# Patient Record
(demographics unavailable — no encounter records)

---

## 2020-01-25 NOTE — RAD
EXAM: Single view of the chest



HISTORY:   Dyspnea



COMPARISON: 7/7/2018



FINDINGS: Single view of the chest shows an enlarged but stable cardiomediastinal silhouette.  Athero
sclerotic calcifications are seen in the aorta.  There is no evidence of consolidation, mass, or

pleural effusion. The bones are unremarkable.



IMPRESSION: No evidence of acute cardiopulmonary disease



Reported By: Josh Duron 

Electronically Signed:  1/25/2020 10:59 AM

## 2020-01-26 NOTE — PRG
DATE OF SERVICE:  01/26/2020



SUBJECTIVE:  The patient is seen and examined at the bedside.  She coughs up quite a

bit of yellowish phlegm.  Her shortness of breath is somewhat better.  She is on

oxygen. 



OBJECTIVE:  VITAL SIGNS:  Blood pressure is 137/71, pulse is 85, temperature is

97.6, respiratory rate is 16, O2 saturation is 95% on 2 L by nasal cannula. 

HEENT:  Head is atraumatic and normocephalic.  Eyes are PERRLA.  Sclerae are

nonicteric.  Oral mucosa is moist. 

NECK:  Supple. 

LUNGS:  A few wheezes and rales at both bases.  Lung exam, very emphysematous. 

HEART:  S1, S2 normal.  No S3.  No S4.  Regular. 

ABDOMEN:  Soft, nontender.  Bowel sounds are present.  No organomegaly. 

EXTREMITIES:  No clubbing, cyanosis, or edema. 

NEUROLOGIC:  She is alert and oriented x4.  There are no any motor or sensory

deficits.  Cranial nerves are intact. 



LABORATORY DATA:  Labs showed white count of 5.4, hemoglobin 13.8, hematocrit 42.7,

platelet count 285,000.  Glucose 158, BUN 9.  Electrolytes within normal limits.

Calcium 8.8. 



IMPRESSION:  

1. Exacerbation of chronic obstructive pulmonary disease.

2. Acute respiratory failure with hypoxia.

3. History of congestive heart failure, mild exacerbation.

4. Atrial fibrillation, which converted to normal sinus rhythm at 2:30 this morning

according to the monitoring. 

5. Hyperlipidemia.

6. Hypothyroidism.

7. History of seizure disorder.

8. History of brain aneurysm, status post surgery.



PLAN:  Continue her current regimen, which includes DuoNeb inhaled and IV steroids,

azithromycin IV, apixaban 5 mg twice a day, furosemide 20 mg daily, Coreg 6.25 mg

twice a day, diltiazem 180 mg daily, levothyroxine 50 mcg once a day, potassium

chloride, and ceftriaxone 1 g IV piggyback daily. 







Job ID:  [id number]

## 2020-01-26 NOTE — CON
DATE OF CONSULTATION:  01/26/2020



REASON FOR CONSULTATION:  COPD exacerbation.



HISTORY OF PRESENT ILLNESS:  The patient is an 80-year-old female, who came to the

hospital yesterday with a 3- to 4-day history of increasing shortness of breath that

started with a gastrointestinal infection on Monday of last week.  She now feels

better and has no complaints.  She says she has never been officially diagnosed with

COPD, but has taken some inhalers for quite some time.  She also has a history of

chronic systolic heart failure. 



PAST MEDICAL HISTORY:  

1. Probable COPD.

2. Chronic atrial fibrillation.

3. Systolic heart failure with EF 25%.

4. Hyperlipidemia.

5. Hypothyroidism.

6. Hypertension.

7. Seizure disorder.

8. Brain aneurysm, requiring surgery.

9. Hysterectomy.



MEDICATIONS:  Prior to admission,

1. Coreg.

2. Flovent.

3. Advair.

4. Lasix.

5. Levothyroxine.

6. Fish oil.

7. K-Dur.

8. Ranitidine.

9. Eliquis.

10. Aspirin.

11. Vitamin D3.

12. Cardizem CD.

13. Claritin.

14. Pravachol.



ALLERGIES:  IODINE.



SOCIAL HISTORY:  Quit smoking about 14 years ago after a two-pack year history for

40 years.  Occasionally drinks beer. 



FAMILY MEDICAL HISTORY:  .  Has history of heart disease in the family.



REVIEW OF SYSTEMS:  Twelve-point review of systems is otherwise negative.



PHYSICAL EXAMINATION:

VITAL SIGNS:  Temperature 98.2, pulse 99, respirations 16, O2 saturation 95%, and

blood pressure 144/89. 

GENERAL:  She is awake, alert, in no distress. 

HEENT:  Unremarkable. 

NECK:  No adenopathy or JVD. 

LUNGS:  Clear anteriorly. 

CARDIAC:  S1 and S2.  Regular. 

ABDOMEN:  Soft and nontender. 

EXTREMITIES:  No clubbing, cyanosis, or edema.



LABORATORY DATA:  Micro shows no growth today,.  White blood cell count 5.4,

hematocrit 42.7, and platelet count 285.  Sodium 137, potassium 4.7, chloride 103,

CO2 of 23, BUN 9, creatinine 0.7, glucose 158.  Her x-ray looks fairly clear. 



ASSESSMENT:  Chronic obstructive pulmonary disease with exacerbation-improved

quickly with antibiotics, nebulization treatment, and steroids. 



PLAN:  She should be able to go home as early as tomorrow.  I have nothing to add to

the current management.  We will be available as needed. 







Job ID:  [id number]

## 2020-01-26 NOTE — HP
REASON FOR ADMISSION:  COPD exacerbation.



HISTORY OF PRESENTING ILLNESS:  The patient gives history of cough with

expectoration of green sputum from Monday.  This has been progressively getting

worse.  No fever as such at home.  She does not use any home oxygen, but she 
does

use inhalers and not nebulizers.  She ambulates by herself.  No complaints of 
chest

pain or palpitation.  She lives alone and does all her activities of daily 
living.

She has taken a flu shot for this year. 



PAST MEDICAL AND SURGICAL HISTORY:  History of CHF with systolic and diastolic

dysfunction with ejection fraction of around 25%; chronic atrial fibrillation, 
she

sees Dr. Scott and has a followup appointment next month; dyslipidemia;

hypothyroidism; hypertension; history of seizure disorder; prior history of 
brain

aneurysm with surgery; hysterectomy. 



CURRENT MEDICATIONS:  The patient is on;

1. Coreg 6.25 mg twice daily.

2. Flovent 2 sprays to each nostril daily.

3. Advair Diskus inhaler twice daily.

4. Lasix 20 mg daily.

5. Levothyroxine 50 mcg p.o. daily.

6. Fish oil one capsule daily.

7. K-Dur 20 mEq p.o. daily.

8. Ranitidine 75 mg every 2 days.

9. Eliquis 5 mg twice daily.

10. Aspirin 81 mg daily.

11. Vitamin D3 1000 mg daily.

12. Cardizem  mg daily.

13. Claritin 10 mg p.o. daily.

14. Pravachol 40 mg p.o. at bedtime.



ALLERGIES:  TO IODINE AND SHELLFISH.



PERSONAL HISTORY:  Quit smoking 14 years ago, prior to which has smoked 2 packs 
a

day for nearly 40 years.  She drinks 2 beers at bedtime.  Does not abuse drugs.
  She

lives alone. 



FAMILY HISTORY:  The patient is  from last 32 years.  Her both parents 
are

.  Mom  of natural causes at the age of 80.  Father  of massive 
MI

at the age of 70.  The patient had 3 children, one of them  in a motor 
vehicle

accident.  Has 2 sons living, who are 60 and 55 years old. 



CODE STATUS:  Do not attempt to resuscitate.  This was discussed with the 
patient at

bedside.  Power of  is her close friend, Mr. ZAHEER Lundberg or her older son, 
Mr. Shan Cuevas. 



REVIEW OF SYSTEMS:  CONSTITUTIONAL:  Negative for weight loss or gain, ability 
to

conduct usual activities. 

SKIN:  Negative for rash, itching. 

EYES:  Negative for double vision, pain. 

ENT/MOUTH:  Negative for nose bleeding, neck stiffness, pain, tenderness. 

CARDIOVASCULAR:  Negative for palpitations, dyspnea on exertion, orthopnea. 

RESPIRATORY:  Negative for shortness of breath, wheezing, cough, hemoptysis, 
fever

or night sweats. 

GASTROINTESTINAL:  Negative for poor appetite, abdominal pain, heartburn, nausea
,

vomiting, constipation, or diarrhea. 

GENITOURINARY:  Negative for urgency, frequency, dysuria, nocturia. 

MUSCULOSKELETAL:  Negative for pain, swelling. 

NEUROLOGIC/PSYCHIATRIC:  Negative for anxiety, depression. 

ALLERGY/IMMUNOLOGIC:  Negative for skin rash, bleeding tendency.



PHYSICAL EXAMINATION:

GENERAL:  The patient is an 80-year-old female, who is currently not in any 
acute

distress. 

VITAL SIGNS:  Blood pressure 120/86, pulse 94 per minute, respiratory rate 24 
per

minute, temperature 98.1 degrees Fahrenheit, saturating 93% on 4 L nasal 
cannula. 

NECK:  Supple.  No elevated JVD. 

HEENT:  Eyes; extraocular muscles intact.  Pupils reacting to light.  Oral 
cavity,

mucous membranes are moist.  No exudates or congestion. 

CARDIOVASCULAR:  S1, S2 heard.  Irregular rhythm. 

RESPIRATORY:  Air entry 1+ bilateral.  Scattered wheezes plus bilateral.  There 
are

basal rales as well. 

ABDOMEN:  Soft.  Bowel sounds heard.  No tenderness, rigidity, or guarding. 

EXTREMITIES:  No peripheral edema or calf tenderness. 

VASCULAR:  Peripheral pulses 1+ bilateral.  No ischemic ulcerations or 
gangrene. 

CENTRAL NERVOUS SYSTEM:  No gross focal deficits noted.  The patient is alert,

awake, and oriented well. 

PSYCHIATRIC:  The patient's mood is euthymic.  No hallucinations or delusions.



LABORATORY DATA:  The patient's prior pulmonary function test done in 2018

showed mild obstructive lung disease without reversibility.  Chest x-ray done 
this

admission showed no acute cardiopulmonary abnormality.  EKG done shows atrial

flutter at 104 beats per minute.  There is T-inversion seen in lateral leads V4
, V5,

V6.  .  Serum glucose 192, lactic acid 2.7, BUN 8, creatinine 1.0.  Liver

enzymes within normal limits.  Albumin is 4.1.  Electrolytes stable.  White 
count of

9, H and H of 15 and 48, platelet count 300, MCV is 104 with 60% neutrophils, 21
%

bands, 14% monocytes.  Macrocytosis seen. 



CLINICAL IMPRESSION AND PLAN:  The patient will be admitted to telemetry for 
acute

respiratory failure with hypoxia, chronic obstructive pulmonary disease

exacerbation.  The patient does not appear to have clinically congestive heart

failure exacerbation, although, her ejection fraction is only 25%.  We will 
continue

her ceftriaxone and Zithromax, which has been started in the ER.  She will be 
on a

short course of steroids for 2 days with rapid taper.  DuoNeb q.6 hourly.  The

patient has known history of atrial fibrillation, but currently she is in atrial

flutter.  We will consult Dr. Taylor for Pulmonology.  If needed, Cardiology 
will

be consulted if her atrial fibrillation or flutter gets worse with nebulizers.  
We

will continue Eliquis, Coreg, Cardizem CD, Lasix, K-Dur, levothyroxine, fish oil
,

Pravachol as before.  We will continue to closely monitor her on telemetry for 
now. 







Job ID:  [id number]



MTDD

## 2020-01-27 NOTE — PDOC.HOSPP
- Subjective


Encounter Date: 01/27/20


Encounter Time: 09:20


Subjective: 





Pt seen for followup re: acute hypoxic respiratory failure.  feels better.





- Objective


Vital Signs & Weight: 


 Vital Signs (12 hours)











  Temp Pulse Resp BP BP BP Pulse Ox


 


 01/27/20 16:00  97.5 F L  97  18    127/99 H  96


 


 01/27/20 14:14   90  14    


 


 01/27/20 11:24  97.4 F L  94  18   135/92 H   95


 


 01/27/20 08:46     142/84 H   


 


 01/27/20 08:00        94 L


 


 01/27/20 07:38  97.8 F  94  18   139/94 H   94 L


 


 01/27/20 07:26        90 L


 


 01/27/20 07:22   91  14    








 Weight











Admit Weight                   135 lb 3.2 oz


 


Weight                         135 lb 3.2 oz














I&O: 


 











 01/26/20 01/27/20 01/28/20





 06:59 06:59 06:59


 


Intake Total  240 


 


Output Total  500 


 


Balance  -260 











Result Diagrams: 


 01/27/20 08:16





 01/27/20 08:16


Additional Labs: 


 Accuchecks











  01/27/20 01/27/20 01/27/20





  17:06 10:43 05:10


 


POC Glucose  178 H  165 H  154 H














  01/26/20





  20:29


 


POC Glucose  147 H








Labs and MARs reviewed by me


EKG Reviewed by me: Yes (Tele;  a. fib)





Hospitalist ROS





- Review of Systems


Respiratory: reports: SOB with excertion


Gastrointestinal: denies: nausea, vomiting, abdominal pain, diarrhea, 

constipation, melena, hematochezia


Genitourinary: denies: dysuria, frequency, incontinence, hematuria, retention





- Medication


Medications: 


Active Medications











Generic Name Dose Route Start Last Admin





  Trade Name Freq  PRN Reason Stop Dose Admin


 


Albuterol/Ipratropium  3 ml  01/26/20 01:00  01/27/20 14:14





  Duoneb  NEB   3 ml





  I4OE-WM ANTHONY   Administration





     





     





     





     


 


Apixaban  5 mg  01/26/20 09:00  01/27/20 08:45





  Eliquis  PO   5 mg





  BID ANTHONY   Administration





     





     





     





     


 


Aspirin  81 mg  01/26/20 09:00  01/27/20 08:45





  Ecotrin  PO   81 mg





  DAILY ANTHONY   Administration





     





     





     





     


 


Atorvastatin Calcium  10 mg  01/26/20 21:00  01/26/20 21:30





  Lipitor  PO   10 mg





  HS ANTHONY   Administration





     





     





     





     


 


Carvedilol  6.25 mg  01/26/20 09:00  01/27/20 08:46





  Coreg  PO   6.25 mg





  BID ANTHONY   Administration





     





     





     





     


 


Cholecalciferol  1,000 units  01/26/20 09:00  01/27/20 08:45





  Vitamin D3  PO   1,000 units





  DAILY ANTHONY   Administration





     





     





     





     


 


Diltiazem HCl  180 mg  01/26/20 09:00  01/27/20 08:47





  Cardizem Cd  PO   180 mg





  DAILY ANTHONY   Administration





     





     





     





     


 


Famotidine  20 mg  01/26/20 09:00  01/27/20 08:45





  Pepcid  PO   20 mg





  DAILY ANTHONY   Administration





     





     





     





     


 


Fish Oil  1,000 mg  01/26/20 09:00  01/27/20 08:46





  Fish Oil  PO   1,000 mg





  DAILY ANTHONY   Administration





     





     





     





     


 


Fluticasone Propionate  0 gm  01/26/20 09:00  01/27/20 08:46





  Flonase Nasal [place]  NASAL   2 spr





  DAILY ANTHONY   Administration





     





     





     





     


 


Furosemide  20 mg  01/26/20 09:00  01/27/20 08:45





  Lasix  PO   20 mg





  DAILY ANTHONY   Administration





     





     





     





     


 


Azithromycin 500 mg/ Sodium  250 mls @ 250 mls/hr  01/25/20 17:00  01/27/20 16:

29





  Chloride  IVPB   250 mls





  Q24HR ANTHONY   Administration





     





     





     





     


 


Ceftriaxone Sodium 1 gm/  100 mls @ 200 mls/hr  01/25/20 18:00  01/27/20 18:26





  Sodium Chloride  IVPB   100 mls





  Q24HR ANTHONY   Administration





     





     





     





     


 


Insulin Human Lispro  0 units  01/26/20 00:52  01/26/20 17:12





  Humalog  SC   4 unit





  .MODERATE SLIDING SC PRN   Administration





  Moderate Correctional Scale   





     





     





     


 


Levothyroxine Sodium  50 mcg  01/26/20 21:00  01/26/20 21:30





  Synthroid  PO   50 mcg





  HS ANTHONY   Administration





     





     





     





     


 


Loratadine  10 mg  01/26/20 09:00  01/27/20 08:46





  Claritin  PO   10 mg





  DAILY ANTHONY   Administration





     





     





     





     


 


Methylprednisolone Sodium Succinate  20 mg  01/26/20 06:00  01/27/20 14:50





  Solu-Medrol  IVP   20 mg





  Q8HR ANTHONY   Administration





     





     





     





     


 


Mometasone Furoate/Formoterol Fumar  2 puff  01/26/20 06:30  01/27/20 07:22





  Dulera 200 Mcg/5 Mcg Inhaler  INH   2 puff





  BID-RT ANTHONY   Administration





     





     





     





     


 


Potassium Chloride  20 meq  01/26/20 09:00  01/27/20 08:45





  K-Dur  PO   20 meq





  DAILY ANTHONY   Administration





     





     





     





     














- Exam


General Appearance: NAD


Eye: anicteric sclera


ENT: moist mucosa


Neck: supple, no JVD


Heart: no rubs, irregular


Respiratory: CTAB


Gastrointestinal: soft, non-tender


Extremities: no cyanosis


Skin: no lesions


Psychiatric: normal affect, normal behavior





Hosp A/P


(1) Acute respiratory failure with hypoxia


Code(s): J96.01 - ACUTE RESPIRATORY FAILURE WITH HYPOXIA   Status: Acute   





(2) COPD exacerbation


Code(s): J44.1 - CHRONIC OBSTRUCTIVE PULMONARY DISEASE W (ACUTE) EXACERBATION   

Status: Acute   





(3) Hypothyroid


Code(s): E03.9 - HYPOTHYROIDISM, UNSPECIFIED   Status: Chronic   





(4) Afib


Code(s): I48.91 - UNSPECIFIED ATRIAL FIBRILLATION   Status: Chronic   





- Plan


respiratory therapy, out of bed/ambulate





Pt had significant hypoxia with ambulation.


Will arrange for home oxygen and discharge home tomorrow.


Continue apixaban.


Switch to oral antibiotics and steroids.

## 2020-01-27 NOTE — PRG
DATE OF SERVICE:  01/27/2020



SUBJECTIVE:  Ms. Cuevas is anxious to go home.  She had no complaints today.



OBJECTIVE:  VITAL SIGNS:  Temperature 97.8, pulse 94, respirations 18, O2 saturation

94% on 1.5 L. 

HEENT:  Unremarkable. 

NECK:  No adenopathy or JVD. 

CHEST:  Clear without wheezing. 

CARDIAC:  S1 and S2, regular. 

ABDOMEN:  Soft. 

EXTREMITIES:  No edema.



ASSESSMENT:  Chronic obstructive pulmonary disease with exacerbation.



PLAN:  Can go home on tapered steroids, nebulization therapy, and antibiotics.

Pulmonary will sign off. 







Job ID:  [id number]

## 2020-01-29 NOTE — PQF
FIDEL PICHARDO DAVID

[id number]                                                             [address]

[id number]                             

                                   

CLINICAL DOCUMENTATION CLARIFICATION FORM:  POST DISCHARGE



Addendum to original discharge summary date:  __________________________________
____



Late entry note date:  _________________________________________________________
__











DATE:01/29/2020                                                    ATTN: ALEXANDER CARRENO





Please exercise your independent, professional judgment in responding to the 
clarification form. 

Clinical indicators are provided on the bottom of this form for your review





Please check appropriate box(s) to clarify if the following diagnosis has been 
ruled in or ruled out: Sepsis 



[  ] Ruled in diagnosis

     [  ] Continue to treat        [  ] Resolved

[ X ] Ruled out diagnosis

[  ] Cannot rule out diagnosis

[  ] Other diagnosis ___________

[  ] Unable to determine







For continuity of documentation, please document condition throughout progress 
notes and discharge summary.  Thank You.



CLINICAL INDICATORS - SIGNS / SYMPTOMS / LABS



SIRS scoring:Respiratory rate >20/min, PaCo2<32 mm Hg, Heart rate > 90, Yes, 
Patient did meet at least 2 criteria for STEP 1, Productive cough /PNA yes, 
Patient did meet at least 1 criteria for STEP 2-Documented in ED on 01/25 by 
Alin Ho DO

Resp-24, Pulse-100-Documented in ED on 01/25 by Alin Ho DO

Sepsis-Documented in ED on 01/25 by Alin Ho DO

Pneumonia-Documented in ED on 01/25 by Alin Ho DO

Lactic acid-2.7-Documented in H&P on 01/25 by Sarah Judd

Acute respiratory failure with hypoxia,COPD exacerbation-Documented in H&P on 01
/25 by Sarah Judd



RISK FACTORS

Acute respiratory failure with hypoxia,COPD exacerbation-Documented in H&P on 01
/25 by Sarah Judd



TREATMENTS

We will continue her ceftriaxone and Zithromax which has been started in the ER-
Documented in H&P on 01/25 by Sarah Judd



(This form is maintained as a part of the permanent medical record)

2015 [employer]  All Rights Reserved

[email or web address]    [phone]
[phone]

                                                              



MTDD

## 2020-01-29 NOTE — PQF
FIDEL PICHARDO DAVID

[id number]                                                             [facility]

[id number]                             

                                   

CLINICAL DOCUMENTATION CLARIFICATION FORM:  POST DISCHARGE



Addendum to original discharge summary date:  __________________________________
____



Late entry note date:  _________________________________________________________
__











DATE:01/29/2020                                                   ATTN: ALEXANDER CARRENO





Please exercise your independent, professional judgment in responding to the 
clarification form. 

Clinical indicators are provided on the bottom of this form for your review





Please check appropriate box(s) to clarify if the following diagnosis has been 
ruled in or ruled out: Pneumonia



[  ] Ruled in diagnosis

     [  ] Continue to treat        [  ] Resolved

[ X ] Ruled out diagnosis

[  ] Cannot rule out diagnosis

[  ] Other diagnosis ___________

[  ] Unable to determine





For continuity of documentation, please document condition throughout progress 
notes and discharge summary.  Thank You.



CLINICAL INDICATORS - SIGNS / SYMPTOMS / LABS



Patient presents for evaluation of cough , productive of green sputum-
Documented in ED on 01/25 by Alin Ho DO

Rocephin and azithromycin given prior to arrival with pneumonia suspected--
Documented in ED on 01/25 by Alin Ho DO

Pneumonia unspecified organism-Documented in ED on 01/25 by Alin Ho DO

Acute respiratory failure with hypoxia,COPD exacerbation-Documented in H&P on 01
/25 by Sarah Judd



RISK FACTORS

Acute respiratory failure with hypoxia,COPD exacerbation-Documented in H&P on 01
/25 by Sarah Judd



TREATMENTS

We will continue her ceftriaxone and Zithromax which has been started in the ER-
Documented in H&P on 01/25 by Sarah Judd









[employer]

(This form is maintained as a part of the permanent medical record)

2015 [employer].  All Rights Reserved

[email or web address]    [phone]
[phone]

                                                              



 

[place]

## 2020-01-29 NOTE — DIS
DATE OF ADMISSION:  01/25/2020



DATE OF DISCHARGE:  01/28/2020



PRIMARY CARE PROVIDER:  Dr. Alexa Martin.



DISCHARGE DIAGNOSES:  

1. Acute hypoxic respiratory failure.

2. Chronic obstructive pulmonary disease exacerbation.



CONDITION OF PATIENT ON THE DAY OF DISCHARGE:  Stable.  I assessed Ms. Cuevas on the

day of discharge.  She denies any chest pain or shortness of breath.  Vital signs

are stable.  S1 and S2 are heard, regular.  Lungs are clear to auscultation

bilaterally. 



CONSULTATIONS DURING THIS HOSPITALIZATION:  Pulmonary and Critical Care Medicine,

Dr. José Taylor. 



POST-ACUTE CARE FOLLOWUP:  With primary care provider in 3 days and with Dr. Taylor

in 2 to 3 weeks. 



DISCHARGE MEDICATIONS:  Omnicef 300 mg 2 times a day for 1 week and oral prednisone

taper are her new medications.  Otherwise, no change was made to her pre-admission

home medications as dictated by Dr. Judd in his history and physical note

dated 01/26/2020. 



HOSPITAL COURSE:  Ms. Cuevas is a pleasant 80-year-old lady, who was admitted to [facility] on 01/25/2020, for acute hypoxic respiratory failure

secondary to COPD exacerbation.  She improved with oxygen, steroids,

bronchodilators, and antibiotics.  She was seen by Pulmonary and Critical Care

Medicine Service. 



On 01/27, her oxygen saturations dropped to 74% with ambulation and improved after

she used oxygen again.  She will need home oxygen.  Arrangements are being made for

the same. 



Many thanks for allowing me to participate in your patient's care.  Please feel free

to contact with any questions or concerns. 



DISCHARGE DESTINATION:  Home.



TIME SPENT:  Total amount of time spent coordinating this discharge:  32 minutes.



DIET:  Heart healthy.



ACTIVITY:  As tolerated.



TIME SPENT:  Total amount of time spent coordinating this discharge, 31 minutes.







Job ID:  [id number]

## 2020-07-16 NOTE — MMO
Bilateral MAMMO Bilat Screen [facility].

 

CLINICAL HISTORY:

Patient is 80 years old and is seen for screening. The patient has no family

history of breast cancer.  The patient has no personal history of cancer.

 

VIEWS:

The views performed were:  bilateral craniocaudal with tomosynthesis and

bilateral mediolateral oblique with tomosynthesis.

 

FILMS COMPARED:

The present examination has been compared to prior imaging studies performed at

[facility] on 06/28/2018 and 07/11/2019, and at [facility] on 01/21/2016 and 01/26/2017.

 

This study has been interpreted with the assistance of computer-aided detection.

 

MAMMOGRAM FINDINGS:

The breasts are heterogeneously dense, which could obscure a lesion on

mammography.

 

Benign calcifications are noted bilaterally.

 

There are no suspicious masses, suspicious calcifications, or new areas of

architectural distortion.

 

IMPRESSION:

THERE IS NO MAMMOGRAPHIC EVIDENCE OF MALIGNANCY.

 

A ROUTINE FOLLOW-UP MAMMOGRAM IN 1 YEAR IS RECOMMENDED.

 

THE RESULTS OF THIS EXAM WERE SENT TO THE PATIENT.

 

ACR BI-RADS Category 2 - Benign finding

 

MAMMOGRAPHY NOTE:

 1. A negative mammogram report should not delay a biopsy if a dominant of

 clinically suspicious mass is present.

 2. Approximately 10% to 15% of breast cancers are not detected by

 mammography.

 3. Adenosis and dense breasts may obscure an underlying neoplasm.

 

 

Reported by: CHANELL KILGORE MD

Electonically Signed: [id number]

## 2020-07-16 NOTE — BD
EXAM: DEXA bone density examination



HISTORY: 80-year-old postmenopausal female for screening



COMPARISON: None 11/9/2017



FINDINGS:

L1--bone mineral density 0.941 g/sq cm; T score -0.4

L2--bone mineral density 1.028 g/sq cm; T score 0.0

L3--bone mineral density 1.123 g/sq cm; T score 0.4

L4--bone mineral density 1.235 g/sq cm; T score 1.6

Total L1-L4--bone mineral density 1.090 g/sq cm; T score 0.4



Left femoral neck--bone mineral density0.775; T score -0.7

Total proximal left femur--bone mineral density 0.795; T score -1.2



IMPRESSION: Osteopenia. This patient has a 10 year WHO fracture risk of a major osteoporotic fracture
 of 12% and of a hip fracture of 2.7%. Compared to the prior examination, the bone density has

decreased approximately 3.7% in the hip.



Reported By: Josh Duron 

Electronically Signed:  7/16/2020 1:06 PM

## 2020-07-23 NOTE — CT
LOW DOSE CT SCAN OF CHEST WITHOUT IV CONTRAST FOR LUNG CANCER SCREENING:

 

Date:  07/23/2020

 

HISTORY:  

Smoking and nicotine dependence. Greater than 50 pack year smoking history. 

 

COMPARISON:  

06/27/2019. 

 

FINDINGS:

Tiny calcified granuloma in the right upper lobe is again seen. No suspicious pulmonary nodules are i
dentified. Emphysematous changes are again seen. Scarring in the right lung base and lingula are rede
monstrated. 

 

No pleural or pericardial effusions are seen. There are vascular calcifications without evidence of a
neurysmal dilatation of the abdominal aorta. A small hiatal hernia is present. 

 

IMPRESSION: 

Lung-RADS Category 2:  Negative. Benign pulmonary findings. 

 

RECOMMENDATION:

Continue annual screening with LDCT in 12 months. 

 

 

POS: OFF